# Patient Record
Sex: MALE | Race: WHITE | Employment: OTHER | URBAN - METROPOLITAN AREA
[De-identification: names, ages, dates, MRNs, and addresses within clinical notes are randomized per-mention and may not be internally consistent; named-entity substitution may affect disease eponyms.]

---

## 2019-04-17 ENCOUNTER — HOSPITAL ENCOUNTER (OUTPATIENT)
Dept: LAB | Age: 64
Discharge: HOME | End: 2019-04-17

## 2019-04-17 PROCEDURE — 88305 TISSUE EXAM BY PATHOLOGIST: CPT

## 2019-07-15 PROBLEM — E66.01 SEVERE OBESITY (HCC): Status: ACTIVE | Noted: 2019-07-15

## 2022-03-19 PROBLEM — E66.01 SEVERE OBESITY (HCC): Status: ACTIVE | Noted: 2019-07-15

## 2023-05-30 ENCOUNTER — OFFICE VISIT (OUTPATIENT)
Age: 68
End: 2023-05-30
Payer: COMMERCIAL

## 2023-05-30 VITALS
WEIGHT: 255 LBS | SYSTOLIC BLOOD PRESSURE: 118 MMHG | HEIGHT: 72 IN | HEART RATE: 65 BPM | BODY MASS INDEX: 34.54 KG/M2 | DIASTOLIC BLOOD PRESSURE: 82 MMHG

## 2023-05-30 DIAGNOSIS — I10 ESSENTIAL HYPERTENSION: ICD-10-CM

## 2023-05-30 DIAGNOSIS — I25.10 CORONARY ARTERY DISEASE INVOLVING NATIVE CORONARY ARTERY OF NATIVE HEART WITHOUT ANGINA PECTORIS: Primary | ICD-10-CM

## 2023-05-30 DIAGNOSIS — E78.5 DYSLIPIDEMIA: ICD-10-CM

## 2023-05-30 PROCEDURE — 3074F SYST BP LT 130 MM HG: CPT | Performed by: INTERNAL MEDICINE

## 2023-05-30 PROCEDURE — G8428 CUR MEDS NOT DOCUMENT: HCPCS | Performed by: INTERNAL MEDICINE

## 2023-05-30 PROCEDURE — 3017F COLORECTAL CA SCREEN DOC REV: CPT | Performed by: INTERNAL MEDICINE

## 2023-05-30 PROCEDURE — 4004F PT TOBACCO SCREEN RCVD TLK: CPT | Performed by: INTERNAL MEDICINE

## 2023-05-30 PROCEDURE — 1123F ACP DISCUSS/DSCN MKR DOCD: CPT | Performed by: INTERNAL MEDICINE

## 2023-05-30 PROCEDURE — 99214 OFFICE O/P EST MOD 30 MIN: CPT | Performed by: INTERNAL MEDICINE

## 2023-05-30 PROCEDURE — 93000 ELECTROCARDIOGRAM COMPLETE: CPT | Performed by: INTERNAL MEDICINE

## 2023-05-30 PROCEDURE — 3079F DIAST BP 80-89 MM HG: CPT | Performed by: INTERNAL MEDICINE

## 2023-05-30 PROCEDURE — G8417 CALC BMI ABV UP PARAM F/U: HCPCS | Performed by: INTERNAL MEDICINE

## 2023-05-30 NOTE — PROGRESS NOTES
(VIAGRA) 100 MG tablet, Take 100 mg by mouth as needed, Disp: , Rfl:     tiotropium (SPIRIVA) 18 MCG inhalation capsule, Take by mouth daily, Disp: , Rfl:     valsartan (DIOVAN) 160 MG tablet, Take 160 mg by mouth daily, Disp: , Rfl:     Bennie Mcmullen MD  5/30/2023  11:21 AM    Pt is instructed to follow all appropriate cardiac risk factor recommendations and to be compliant with meds and testing. Instructed to follow up appropriately and seek urgent medical care if acute or unstable issues arise. Results of some tests may be viewed thru [address] but this does not substitute for follow up with MD. If follow up is not scheduled pt is instructed to call for follow up. Patient is instructed that if they use triage and phone calls to address medical issues that they may incur a charge for phone call and triage services. If they do not wish to potentially incur a charge for this then they may make follow-up appointments in the office to discuss issues that they may otherwise choose to address over MyChart and the phone triage system.

## 2024-05-15 ENCOUNTER — OFFICE VISIT (OUTPATIENT)
Age: 69
End: 2024-05-15
Payer: MEDICARE

## 2024-05-15 VITALS
HEIGHT: 72 IN | DIASTOLIC BLOOD PRESSURE: 90 MMHG | BODY MASS INDEX: 36.03 KG/M2 | HEART RATE: 75 BPM | WEIGHT: 266 LBS | SYSTOLIC BLOOD PRESSURE: 130 MMHG

## 2024-05-15 DIAGNOSIS — I10 ESSENTIAL HYPERTENSION: ICD-10-CM

## 2024-05-15 DIAGNOSIS — E78.5 DYSLIPIDEMIA: ICD-10-CM

## 2024-05-15 DIAGNOSIS — I25.10 CORONARY ARTERY DISEASE INVOLVING NATIVE CORONARY ARTERY OF NATIVE HEART WITHOUT ANGINA PECTORIS: Primary | ICD-10-CM

## 2024-05-15 PROCEDURE — G8428 CUR MEDS NOT DOCUMENT: HCPCS | Performed by: INTERNAL MEDICINE

## 2024-05-15 PROCEDURE — 99214 OFFICE O/P EST MOD 30 MIN: CPT | Performed by: INTERNAL MEDICINE

## 2024-05-15 PROCEDURE — 4004F PT TOBACCO SCREEN RCVD TLK: CPT | Performed by: INTERNAL MEDICINE

## 2024-05-15 PROCEDURE — 93000 ELECTROCARDIOGRAM COMPLETE: CPT | Performed by: INTERNAL MEDICINE

## 2024-05-15 PROCEDURE — 3017F COLORECTAL CA SCREEN DOC REV: CPT | Performed by: INTERNAL MEDICINE

## 2024-05-15 PROCEDURE — 1123F ACP DISCUSS/DSCN MKR DOCD: CPT | Performed by: INTERNAL MEDICINE

## 2024-05-15 PROCEDURE — 3075F SYST BP GE 130 - 139MM HG: CPT | Performed by: INTERNAL MEDICINE

## 2024-05-15 PROCEDURE — 3080F DIAST BP >= 90 MM HG: CPT | Performed by: INTERNAL MEDICINE

## 2024-05-15 PROCEDURE — G8417 CALC BMI ABV UP PARAM F/U: HCPCS | Performed by: INTERNAL MEDICINE

## 2024-05-15 NOTE — PROGRESS NOTES
[facility], [address], [address]  [place] [address]  PHONE: [phone]    SUBJECTIVE: /HPI  Vik Arroyo (1955) is a 68 y.o. male seen for a follow up visit regarding the following:   Specialty Problems          Cardiology Problems    CAD (coronary artery disease)        Migraines         Pt is a 68 y.o. male doing well at this time with no complaints of syncope, chest pain, palpitations, or dyspnea.  Pt doing well. No chest pain. No palpitations. Patient denies syncope. No dyspnea. States they are taking meds. Maintains a normal level of activity for them without symptoms. No dizziness or lightheadedness. All above conditions stable.        Past Medical History, Past Surgical History, Family history, Social History, and Medications were all reviewed with the patient today and updated as necessary.    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   Social History     Tobacco Use    Smoking status: Former     Current packs/day: 0.00     Types: Cigarettes     Quit date: 2013     Years since quittin.3    Smokeless tobacco: Current   Substance Use Topics    Alcohol use: No       Current Outpatient Medications:     cyanocobalamin 500 MCG tablet, Take 1 tablet by mouth daily, Disp: , Rfl:     albuterol sulfate  (90 Base) MCG/ACT inhaler, Take by mouth as needed, Disp: , Rfl:     amLODIPine (NORVASC) 5 MG tablet, Take 1 tablet by mouth daily, Disp: , Rfl:

## 2024-06-12 RX ORDER — NITROGLYCERIN 0.4 MG/1
0.4 TABLET SUBLINGUAL EVERY 5 MIN PRN
Qty: 25 TABLET | Refills: 2 | Status: SHIPPED | OUTPATIENT
Start: 2024-06-12

## 2024-06-12 RX ORDER — NITROGLYCERIN 0.4 MG/1
0.4 TABLET SUBLINGUAL EVERY 5 MIN PRN
COMMUNITY
End: 2024-06-12 | Stop reason: SDUPTHER

## 2024-06-12 NOTE — TELEPHONE ENCOUNTER
MEDICATION REFILL REQUEST      Name of Medication:  Nitroglycerin  Dose:  0.4 mg  Frequency:  as needed  Quantity:  ?  Days' supply:  ?      Pharmacy Name/Location:  [phone]  Pt said he asked for this at last ov visit  ,pharmacy never received anything please call in asap

## 2024-06-12 NOTE — TELEPHONE ENCOUNTER
Requested Prescriptions      No prescriptions requested or ordered in this encounter         Verified rx. Last OV 5/15/24. Erx to pharm on file.

## 2024-06-20 ENCOUNTER — TELEPHONE (OUTPATIENT)
Age: 69
End: 2024-06-20

## 2024-06-25 NOTE — TELEPHONE ENCOUNTER
Called and spoke with patient and he denies taking viagra, [facility] let them know that pt doesn't take viagra okay to refill nitrostat. JS

## 2025-05-14 ENCOUNTER — TELEPHONE (OUTPATIENT)
Age: 70
End: 2025-05-14

## 2025-05-15 ENCOUNTER — OFFICE VISIT (OUTPATIENT)
Age: 70
End: 2025-05-15
Payer: MEDICARE

## 2025-05-15 VITALS
HEIGHT: 72 IN | HEART RATE: 72 BPM | DIASTOLIC BLOOD PRESSURE: 86 MMHG | SYSTOLIC BLOOD PRESSURE: 136 MMHG | WEIGHT: 247 LBS | BODY MASS INDEX: 33.46 KG/M2

## 2025-05-15 DIAGNOSIS — I25.10 CORONARY ARTERY DISEASE INVOLVING NATIVE CORONARY ARTERY OF NATIVE HEART WITHOUT ANGINA PECTORIS: Primary | ICD-10-CM

## 2025-05-15 DIAGNOSIS — G47.33 OSA (OBSTRUCTIVE SLEEP APNEA): ICD-10-CM

## 2025-05-15 DIAGNOSIS — J44.9 CHRONIC OBSTRUCTIVE PULMONARY DISEASE, UNSPECIFIED COPD TYPE (HCC): ICD-10-CM

## 2025-05-15 PROCEDURE — 99214 OFFICE O/P EST MOD 30 MIN: CPT | Performed by: INTERNAL MEDICINE

## 2025-05-15 PROCEDURE — G8417 CALC BMI ABV UP PARAM F/U: HCPCS | Performed by: INTERNAL MEDICINE

## 2025-05-15 PROCEDURE — G8428 CUR MEDS NOT DOCUMENT: HCPCS | Performed by: INTERNAL MEDICINE

## 2025-05-15 PROCEDURE — 1126F AMNT PAIN NOTED NONE PRSNT: CPT | Performed by: INTERNAL MEDICINE

## 2025-05-15 PROCEDURE — 3023F SPIROM DOC REV: CPT | Performed by: INTERNAL MEDICINE

## 2025-05-15 PROCEDURE — 4004F PT TOBACCO SCREEN RCVD TLK: CPT | Performed by: INTERNAL MEDICINE

## 2025-05-15 PROCEDURE — 3017F COLORECTAL CA SCREEN DOC REV: CPT | Performed by: INTERNAL MEDICINE

## 2025-05-15 PROCEDURE — 1123F ACP DISCUSS/DSCN MKR DOCD: CPT | Performed by: INTERNAL MEDICINE

## 2025-05-15 NOTE — PROGRESS NOTES
[facility], [address], [address]  [place] [address]  PHONE: [phone]    SUBJECTIVE: /HPI  Vik Arroyo (1955) is a 69 y.o. male seen for a follow up visit regarding the following:   Specialty Problems          Cardiology Problems    CAD (coronary artery disease)        Migraines         Pt doing well. No chest pain. No palpitations. Patient denies syncope. No dyspnea. States they are taking meds. Maintains a normal level of activity for them without symptoms. No dizziness or lightheadedness. All above conditions stable.  Patient has upcoming sinus surgery.  He would be low risk from a cardiac standpoint    Patient was in [facility] and had an echocardiogram that shows ejection fraction 55 to 65% which is normal grade 1 left ventricular diastolic dysfunction which is normal for age    Past Medical History, Past Surgical History, Family history, Social History, and Medications were all reviewed with the patient today and updated as necessary.    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   Social History     Tobacco Use    Smoking status: Former     Current packs/day: 0.00     Types: Cigarettes     Quit date: 2013     Years since quittin.3    Smokeless tobacco: Current   Substance Use Topics    Alcohol use: No       Current Outpatient Medications:     nitroGLYCERIN (NITROSTAT) 0.4 MG SL tablet, Place 1 tablet under the tongue every 5 minutes as needed

## 2025-05-20 ENCOUNTER — TELEPHONE (OUTPATIENT)
Age: 70
End: 2025-05-20

## 2025-05-20 NOTE — TELEPHONE ENCOUNTER
Called and spoke with patient and let pt know that per Dr. Myers tel encounter 5/14 patient is not supposed to hold ASA 81 MG and needed to remain on it.     Patient was not happy about this and said well I guess I don't get the surgery, I did let him know it is up to him and his surgeon, that's just Dr. Myers's recommendations for patients with CAD.